# Patient Record
Sex: FEMALE | Race: WHITE | NOT HISPANIC OR LATINO | Employment: FULL TIME | URBAN - METROPOLITAN AREA
[De-identification: names, ages, dates, MRNs, and addresses within clinical notes are randomized per-mention and may not be internally consistent; named-entity substitution may affect disease eponyms.]

---
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
## 2023-04-24 PROBLEM — J18.9 COMMUNITY ACQUIRED PNEUMONIA OF RIGHT LOWER LOBE OF LUNG: Status: ACTIVE | Noted: 2023-04-24

## 2023-07-14 ENCOUNTER — TELEPHONE (OUTPATIENT)
Dept: OBSTETRICS AND GYNECOLOGY | Facility: CLINIC | Age: 18
End: 2023-07-14
Payer: MEDICAID

## 2023-07-14 NOTE — TELEPHONE ENCOUNTER
Called pt in regards to left message. Pt's questions were answered and she vu.   ----- Message from Nu Vicente sent at 7/14/2023  3:26 PM CDT -----  Regarding: Patient call back  Who called:LES PAUL [[id number]]    What is the request in detail: Patient is requesting a call back. Patient states she was advised she has PID and an ovarian cyst and feels her 8/17 apt is too far. She would like to know of she can be seen next week as it is an ER f/u   Please advise.    Can the clinic reply by [facility]? No    Best call back number: [phone]    Additional Information: N/A

## 2023-07-21 ENCOUNTER — TELEPHONE (OUTPATIENT)
Dept: OBSTETRICS AND GYNECOLOGY | Facility: CLINIC | Age: 18
End: 2023-07-21
Payer: MEDICAID

## 2023-07-21 NOTE — TELEPHONE ENCOUNTER
Returned pts call. Pt stated that she has an appt for an ER f/u, however she does not think she should wait that long. Pt was diagnosed with PID and request a sooner appt. Vu and sooner appt scheduled     ----- Message from Fide Cruz sent at 7/21/2023 11:10 AM CDT -----  Regarding: Return Call  Who Called: LES PAUL [[id number]]         Who Left Message for Patient: Trisha         Does the patient know what this is regarding? Yes, schedule         Best Call Back Number: myChart         Additional Information: Patient is returning a call.  Please assist.

## 2023-07-21 NOTE — TELEPHONE ENCOUNTER
Returned pts call. Pt did not answer, left vm for pt to give the office a call back     ----- Message from Fide Cruz sent at 7/21/2023  9:01 AM CDT -----  Regarding: Same Day Appt Request  Name of Who is Calling: LES PAUL [[id number]]           What is the request in detail: Patient is requesting a call back to schedule a same day appointment.  Patient is experiencing pelvic pain.  Please assist.           Can the clinic reply by [facility]: No           What Number to Call Back if not in [facility]: [phone]

## 2023-07-24 ENCOUNTER — TELEPHONE (OUTPATIENT)
Dept: OBSTETRICS AND GYNECOLOGY | Facility: CLINIC | Age: 18
End: 2023-07-24
Payer: MEDICAID

## 2023-07-24 PROBLEM — J18.9 COMMUNITY ACQUIRED PNEUMONIA OF RIGHT LOWER LOBE OF LUNG: Status: RESOLVED | Noted: 2023-04-24 | Resolved: 2023-07-24

## 2023-07-24 NOTE — TELEPHONE ENCOUNTER
Returned pts call. Pt stated she was unable to make appt today. Vu and appt r/s to next avail. Pt Vu    ----- Message from Azul Ibanez sent at 7/24/2023  9:26 AM CDT -----  Regarding: pt called  Name of Who is Calling: LES PAUL [[id number]]      What is the request in detail: pt is requesting to reschedule her appt. Please advise       Can the clinic reply by [facility]: No      What Number to Call Back if not in [facility]: [phone]

## 2023-07-26 ENCOUNTER — HOSPITAL ENCOUNTER (EMERGENCY)
Facility: OTHER | Age: 18
Discharge: HOME | End: 2023-07-27
Attending: EMERGENCY MEDICINE
Payer: MEDICAID

## 2023-07-26 DIAGNOSIS — R10.84 GENERALIZED ABDOMINAL PAIN: Primary | ICD-10-CM

## 2023-07-26 DIAGNOSIS — N83.201 RIGHT OVARIAN CYST: ICD-10-CM

## 2023-07-26 LAB
ALBUMIN SERPL BCP-MCNC: 4.3 G/DL (ref 3.5–5.2)
ALP SERPL-CCNC: 67 U/L (ref 55–135)
ALT SERPL W/O P-5'-P-CCNC: 16 U/L (ref 10–44)
ANION GAP SERPL CALC-SCNC: 10 MMOL/L (ref 8–16)
AST SERPL-CCNC: 21 U/L (ref 10–40)
B-HCG UR QL: NEGATIVE
BASOPHILS # BLD AUTO: 0.05 K/UL (ref 0–0.2)
BASOPHILS NFR BLD: 0.8 % (ref 0–1.9)
BILIRUB SERPL-MCNC: 0.2 MG/DL (ref 0.1–1)
BILIRUB UR QL STRIP: NEGATIVE
BUN SERPL-MCNC: 10 MG/DL (ref 6–20)
CALCIUM SERPL-MCNC: 9.6 MG/DL (ref 8.7–10.5)
CHLORIDE SERPL-SCNC: 106 MMOL/L (ref 95–110)
CLARITY UR: CLEAR
CO2 SERPL-SCNC: 22 MMOL/L (ref 23–29)
COLOR UR: YELLOW
CREAT SERPL-MCNC: 0.8 MG/DL (ref 0.5–1.4)
CTP QC/QA: YES
DIFFERENTIAL METHOD: ABNORMAL
EOSINOPHIL # BLD AUTO: 0.2 K/UL (ref 0–0.5)
EOSINOPHIL NFR BLD: 3.6 % (ref 0–8)
ERYTHROCYTE [DISTWIDTH] IN BLOOD BY AUTOMATED COUNT: 13.9 % (ref 11.5–14.5)
EST. GFR  (NO RACE VARIABLE): >60 ML/MIN/1.73 M^2
GLUCOSE SERPL-MCNC: 90 MG/DL (ref 70–110)
GLUCOSE UR QL STRIP: NEGATIVE
HCT VFR BLD AUTO: 33.5 % (ref 37–48.5)
HGB BLD-MCNC: 10.9 G/DL (ref 12–16)
HGB UR QL STRIP: NEGATIVE
IMM GRANULOCYTES # BLD AUTO: 0.01 K/UL (ref 0–0.04)
IMM GRANULOCYTES NFR BLD AUTO: 0.2 % (ref 0–0.5)
KETONES UR QL STRIP: NEGATIVE
LEUKOCYTE ESTERASE UR QL STRIP: NEGATIVE
LIPASE SERPL-CCNC: 25 U/L (ref 4–60)
LYMPHOCYTES # BLD AUTO: 2.2 K/UL (ref 1–4.8)
LYMPHOCYTES NFR BLD: 34.8 % (ref 18–48)
MCH RBC QN AUTO: 27.3 PG (ref 27–31)
MCHC RBC AUTO-ENTMCNC: 32.5 G/DL (ref 32–36)
MCV RBC AUTO: 84 FL (ref 82–98)
MONOCYTES # BLD AUTO: 0.5 K/UL (ref 0.3–1)
MONOCYTES NFR BLD: 8.4 % (ref 4–15)
NEUTROPHILS # BLD AUTO: 3.2 K/UL (ref 1.8–7.7)
NEUTROPHILS NFR BLD: 52.2 % (ref 38–73)
NITRITE UR QL STRIP: NEGATIVE
NRBC BLD-RTO: 0 /100 WBC
PH UR STRIP: 8 [PH] (ref 5–8)
PLATELET # BLD AUTO: 326 K/UL (ref 150–450)
PMV BLD AUTO: 9.5 FL (ref 9.2–12.9)
POTASSIUM SERPL-SCNC: 3.6 MMOL/L (ref 3.5–5.1)
PROT SERPL-MCNC: 7 G/DL (ref 6–8.4)
PROT UR QL STRIP: NEGATIVE
RBC # BLD AUTO: 3.99 M/UL (ref 4–5.4)
SODIUM SERPL-SCNC: 138 MMOL/L (ref 136–145)
SP GR UR STRIP: 1.01 (ref 1–1.03)
URN SPEC COLLECT METH UR: NORMAL
UROBILINOGEN UR STRIP-ACNC: NEGATIVE EU/DL
WBC # BLD AUTO: 6.17 K/UL (ref 3.9–12.7)

## 2023-07-26 PROCEDURE — 85025 COMPLETE CBC W/AUTO DIFF WBC: CPT | Performed by: PHYSICIAN ASSISTANT

## 2023-07-26 PROCEDURE — 81025 URINE PREGNANCY TEST: CPT | Performed by: PHYSICIAN ASSISTANT

## 2023-07-26 PROCEDURE — 81003 URINALYSIS AUTO W/O SCOPE: CPT | Performed by: PHYSICIAN ASSISTANT

## 2023-07-26 PROCEDURE — 63600175 PHARM REV CODE 636 W HCPCS: Performed by: PHYSICIAN ASSISTANT

## 2023-07-26 PROCEDURE — 83690 ASSAY OF LIPASE: CPT | Performed by: PHYSICIAN ASSISTANT

## 2023-07-26 PROCEDURE — 96374 THER/PROPH/DIAG INJ IV PUSH: CPT | Mod: 59

## 2023-07-26 PROCEDURE — 80053 COMPREHEN METABOLIC PANEL: CPT | Performed by: PHYSICIAN ASSISTANT

## 2023-07-26 PROCEDURE — 99285 EMERGENCY DEPT VISIT HI MDM: CPT | Mod: 25

## 2023-07-26 RX ORDER — KETOROLAC TROMETHAMINE 30 MG/ML
15 INJECTION, SOLUTION INTRAMUSCULAR; INTRAVENOUS
Status: COMPLETED | OUTPATIENT
Start: 2023-07-26 | End: 2023-07-26

## 2023-07-26 RX ORDER — OXYCODONE HYDROCHLORIDE 5 MG/1
5 TABLET ORAL
Status: DISCONTINUED | OUTPATIENT
Start: 2023-07-26 | End: 2023-07-27 | Stop reason: HOSPADM

## 2023-07-26 RX ADMIN — IOHEXOL 75 ML: 350 INJECTION, SOLUTION INTRAVENOUS at 11:07

## 2023-07-26 RX ADMIN — KETOROLAC TROMETHAMINE 15 MG: 30 INJECTION, SOLUTION INTRAMUSCULAR; INTRAVENOUS at 11:07

## 2023-07-27 VITALS
HEART RATE: 81 BPM | RESPIRATION RATE: 18 BRPM | OXYGEN SATURATION: 100 % | DIASTOLIC BLOOD PRESSURE: 69 MMHG | BODY MASS INDEX: 22.2 KG/M2 | TEMPERATURE: 98 F | WEIGHT: 130 LBS | HEIGHT: 64 IN | SYSTOLIC BLOOD PRESSURE: 115 MMHG

## 2023-07-27 LAB
BACTERIA GENITAL QL WET PREP: ABNORMAL
CLUE CELLS VAG QL WET PREP: ABNORMAL
FILAMENT FUNGI VAG WET PREP-#/AREA: ABNORMAL
SPECIMEN SOURCE: ABNORMAL
T VAGINALIS GENITAL QL WET PREP: ABNORMAL
WBC #/AREA VAG WET PREP: ABNORMAL
YEAST GENITAL QL WET PREP: ABNORMAL

## 2023-07-27 PROCEDURE — 25500020 PHARM REV CODE 255: Performed by: PHYSICIAN ASSISTANT

## 2023-07-27 PROCEDURE — 87210 SMEAR WET MOUNT SALINE/INK: CPT | Performed by: PHYSICIAN ASSISTANT

## 2023-07-27 PROCEDURE — 87591 N.GONORRHOEAE DNA AMP PROB: CPT | Performed by: PHYSICIAN ASSISTANT

## 2023-07-27 RX ORDER — KETOROLAC TROMETHAMINE 10 MG/1
10 TABLET, FILM COATED ORAL EVERY 6 HOURS PRN
Qty: 10 TABLET | Refills: 0 | Status: SHIPPED | OUTPATIENT
Start: 2023-07-27

## 2023-07-27 NOTE — ED PROVIDER NOTES
Encounter Date: 7/26/2023       History     Chief Complaint   Patient presents with    Abdominal Pain     dx w/ PID on June 5th. Pt states she was suppose to f/u w/ Gyno but has not. Pt states the pain is getting worse. Pt states she is now having Left flank pain & left pelvic pain     17 y.o F with asthma presenting to the ED with complains of abdominal pain. Patient reports that she was diagnosed with PID on 7/3 and treated with flagyl and doxycycline. She was told to follow up with OBGYN, but was unable to schedule an appointment till end of August. She complains of worsening left lower abdominal and flank pain. Despite taking her antibiotics, she denies improvement of symptoms. Denies any urinary symptoms of discharge, dysuria, hematuria. No NV, change in bowel habits, fever, chills.      Review of patient's allergies indicates:  No Known Allergies  Past Medical History:   Diagnosis Date    Asthma      No past surgical history on file.  No family history on file.  Social History     Tobacco Use    Smoking status: Never     Passive exposure: Current    Smokeless tobacco: Never   Substance Use Topics    Alcohol use: Not Currently    Drug use: Not Currently     ROS:  As per HPI    Physical Exam     Initial Vitals   BP Pulse Resp Temp SpO2   07/26/23 2024 07/26/23 2024 07/26/23 2026 07/26/23 2024 07/26/23 2024   120/71 94 18 98.1 °F (36.7 °C) 97 %      MAP       --                Physical Exam    Constitutional: She appears well-developed and well-nourished.   HENT:   Head: Normocephalic and atraumatic.   Eyes: EOM are normal.   Neck:   Normal range of motion.  Cardiovascular:  Normal rate, regular rhythm and normal heart sounds.           Pulmonary/Chest: Breath sounds normal. No respiratory distress.   Abdominal: Abdomen is soft. Bowel sounds are normal. There is abdominal tenderness.   TTP of left lower abdomen and left flank.   TTP of right upper and left upper abdomen.   + CVA tenderness There is rebound.    Genitourinary:    Genitourinary Comments: Normal appearance of the external genitalia, no skin lesions, erythema or masses. Pink vaginal mucosa.  Cervix surrounding os and scant, clear discharge.  No CMT.  Mild adnexal tenderness without masses.     Musculoskeletal:         General: Normal range of motion.      Cervical back: Normal range of motion.     Neurological: She is oriented to person, place, and time.   Skin: Skin is warm.       ED Course   Procedures  Labs Reviewed   URINALYSIS, REFLEX TO URINE CULTURE    Narrative:     Specimen Source->Urine   CBC W/ AUTO DIFFERENTIAL   COMPREHENSIVE METABOLIC PANEL   LIPASE   POCT URINE PREGNANCY          Imaging Results              US Pelvis Complete Non OB (Final result)  Result time 07/26/23 22:24:13   Procedure changed from US Pelvis Comp with Transvag NON-OB (xpd     Final result by Randy Mercer MD (07/26/23 22:24:13)                   Impression:      Overall limited evaluation secondary to absence of transvaginal images.  Neither ovary was identified.      Electronically signed by: Randy Mercer MD  Date:    07/26/2023  Time:    22:24               Narrative:    EXAMINATION:  US PELVIS COMPLETE NON OB    CLINICAL HISTORY:  pelvic pain;    COMPARISON:  CT, 07/05/2023.    TECHNIQUE:  Transabdominal pelvic ultrasound were performed utilizing grayscale ultrasound and color Doppler.  Transvaginal ultrasound was deferred secondary to patient discomfort.    FINDINGS:  Uterus measures 6.7 x 3.5 x 4.5 cm.  No focal abnormality.  The endometrial stripe is not thickened measuring 0.5 cm.    Neither ovary was visualized.  Of note, the ovaries were in a posterior position on recent prior CT, which may explain difficulty visualizing the ovaries with transabdominal imaging.    There is no significant free fluid within the pelvis.                                       Medications   ketorolac injection 15 mg (has no administration in time range)   oxyCODONE immediate  release tablet 5 mg (has no administration in time range)     Medical Decision Making:   History:   Old Medical Records: I decided to obtain old medical records.  Old Records Summarized: records from another hospital.       <> Summary of Records: CT abd pelvis from 7/5/23  New left adnexal cyst, measuring up to 3.1 cm in greatest axial dimension, likely physiologic.     In the appropriate clinical context, the other new intrapelvic findings could be related to uncomplicated PID, menstruation, or endometriosis.  Other infiltrative pathology less likely but not excluded.  Correlate clinically.     New periportal edema and edema in the gallbladder fossa.  Correlate clinically for underlying liver disease, such as possible hepatitis.  PID, if present, could potentially also cause this appearance.     Previously demonstrated findings suspicious for upper urinary tract infection have largely resolved on the right.  Some residual patchy hypodensities in the left kidney possibly represent developing scars, parenchymal cysts, or other parenchymal lesions.  Recurrent mild left pyelonephritis not fully excluded but considered less likely.  Correlate clinically.     Transitional lumbosacral vertebral segment, as detailed above.     Visualized portions of the breasts demonstrate radiographically dense tissue bilaterally, a risk factor for breast cancer.  Correlate clinically.  Initial Assessment:   17 y.o F with asthma presenting with abdominal pain. Recently diagnosed with PID and has completed antibiotics. Presents today with worsening left lower abdominal pain and left flank pain. On exam, TTP of bilateral upper abdomen and left lower abdomen with Left CVA tenderness.   Differential Diagnosis:   Ovarian cyst rupture, tubo ovarian abscess, kidney stone, UTI, pyelonephritis, Varghese Devendra Daniel syndrome, STD  Clinical Tests:   Lab Tests: Ordered  Radiological Study: Ordered           ED Course as of 07/27/23 0059   Wed Jul 26,  2023 2308 CBC without leukocytosis and stable anemia, H&H 10.9/33.5 [AG]   2308 UPT negative, urinalysis signs of infection. [AG]   2320 CMP and lipase unremarkable [AG]   2342 Pelvic ultrasound without visualization of ovaries due to transabdominal only secondary to patient's pain during transvaginal portion. [AG]   Thu Jul 27, 2023   0058      1. Possible constipation.   2. Probable small 1.8 cm complex involuting right ovarian cyst.  Recommend surveillance.   3. There are 2 small subcentimeter probable benign right lower lobe pulmonary nodules.  See above comments.     Improvement from previous CT abdomen imaging from 07/05.    I do not suspect PID today.  Unclear etiology of patient's pain, may be secondary to ovarian cysts.  Will send home with Toradol, advised close follow-up with her gynecologist and referral placed to GI. [AG]      ED Course User Index  [AG] Justino Yarbrough PA-C               Clinical Impression:      1. Generalized abdominal pain    2. Right ovarian cyst               Justino Yarbrough PA-C  07/27/23 0101

## 2023-07-27 NOTE — FIRST PROVIDER EVALUATION
Emergency Department TeleTriage Encounter Note      CHIEF COMPLAINT    Chief Complaint   Patient presents with    Abdominal Pain     dx w/ PID on June 5th. Pt states she was suppose to f/u w/ Gyno but has not. Pt states the pain is getting worse. Pt states she is now having Left flank pain & left pelvic pain       VITAL SIGNS   Initial Vitals   BP Pulse Resp Temp SpO2   07/26/23 2024 07/26/23 2024 07/26/23 2026 07/26/23 2024 07/26/23 2024   120/71 94 18 98.1 °F (36.7 °C) 97 %      MAP       --                   ALLERGIES    Review of patient's allergies indicates:  No Known Allergies    PROVIDER TRIAGE NOTE  17-year-old female with pelvic pain, treated for PID, completed antibiotics.  Still having pain, referred to ED for ultrasound.      ORDERS  Labs Reviewed - No data to display    ED Orders (720h ago, onward)      None              Virtual Visit Note: The provider triage portion of this emergency department evaluation and documentation was performed via [employer], a HIPAA-compliant telemedicine application, in concert with a tele-presenter in the room. A face to face patient evaluation with one of my colleagues will occur once the patient is placed in an emergency department room.      DISCLAIMER: This note was prepared with [employer] voice recognition transcription software. Garbled syntax, mangled pronouns, and other bizarre constructions may be attributed to that software system.

## 2023-07-28 LAB
C TRACH DNA SPEC QL NAA+PROBE: NOT DETECTED
N GONORRHOEA DNA SPEC QL NAA+PROBE: NOT DETECTED

## 2023-11-16 ENCOUNTER — HOSPITAL ENCOUNTER (EMERGENCY)
Facility: HOSPITAL | Age: 18
Discharge: HOME | End: 2023-11-16
Attending: EMERGENCY MEDICINE
Payer: MEDICAID

## 2023-11-16 VITALS
TEMPERATURE: 98 F | SYSTOLIC BLOOD PRESSURE: 117 MMHG | BODY MASS INDEX: 22.31 KG/M2 | RESPIRATION RATE: 18 BRPM | WEIGHT: 130 LBS | OXYGEN SATURATION: 100 % | DIASTOLIC BLOOD PRESSURE: 66 MMHG | HEART RATE: 100 BPM

## 2023-11-16 DIAGNOSIS — R05.9 COUGH: Primary | ICD-10-CM

## 2023-11-16 DIAGNOSIS — B34.9 VIRAL SYNDROME: ICD-10-CM

## 2023-11-16 DIAGNOSIS — Z87.09 HISTORY OF ASTHMA: ICD-10-CM

## 2023-11-16 LAB
B-HCG UR QL: NEGATIVE
CTP QC/QA: YES
GROUP A STREP, MOLECULAR: NEGATIVE
INFLUENZA A, MOLECULAR: NEGATIVE
INFLUENZA B, MOLECULAR: NEGATIVE
SARS-COV-2 RDRP RESP QL NAA+PROBE: NEGATIVE
SPECIMEN SOURCE: NORMAL

## 2023-11-16 PROCEDURE — 99284 EMERGENCY DEPT VISIT MOD MDM: CPT | Mod: 25

## 2023-11-16 PROCEDURE — 87502 INFLUENZA DNA AMP PROBE: CPT

## 2023-11-16 PROCEDURE — 87651 STREP A DNA AMP PROBE: CPT

## 2023-11-16 PROCEDURE — 81025 URINE PREGNANCY TEST: CPT

## 2023-11-16 PROCEDURE — U0002 COVID-19 LAB TEST NON-CDC: HCPCS

## 2023-11-16 RX ORDER — BENZONATATE 100 MG/1
100 CAPSULE ORAL 3 TIMES DAILY PRN
Qty: 20 CAPSULE | Refills: 0 | Status: SHIPPED | OUTPATIENT
Start: 2023-11-16 | End: 2023-11-26

## 2023-11-16 RX ORDER — ALBUTEROL SULFATE 90 UG/1
AEROSOL, METERED RESPIRATORY (INHALATION)
Qty: 18 G | Refills: 0 | Status: SHIPPED | OUTPATIENT
Start: 2023-11-16

## 2023-11-16 NOTE — Clinical Note
"April"Jerome Jo was seen and treated in our emergency department on 11/16/2023.  She may return to work on 11/17/2023.       If you have any questions or concerns, please don't hesitate to call.      Sara Mclaughlin PA-C"

## 2023-11-16 NOTE — ED TRIAGE NOTES
Pt with hx of asthma arrived with c/o cough x 3 weeks with wheezing x 1 week.  Pt endorses chest congestion and SOB even at rest sometimes.  Denies any fever.  Pt states she has been out of her inhaler for a while.  Pt answering questions appropriately, speaking in complete sentences, respirations even and unlabored.  Aao x 4.

## 2023-11-16 NOTE — ED PROVIDER NOTES
"Encounter Date: 11/16/2023       History     Chief Complaint   Patient presents with    Cough     Pt complains of cough x 3 weeks, + nasal congestion, + sort throat, denies fever, pt has hx of asthma, no wheezing noted in triage, pt request refill on inhaler      Patient is a 18-year-old female with a past medical history of asthma who presents to the emergency room for congestion, sore throat, cough, and intermittent wheezing over the past 3 weeks, worse within the past few days.  Denies fever, chills, body aches, abdominal pain, nausea, vomiting trouble swallowing, chest pain, shortness of breath, or headache.  Patient reports she normally uses an albuterol inhaler, but does not have a refill at this moment.  She works at a  and is around sick individuals frequently. In the past, patient has had pneumonia with similar symptoms, so she is worried for such.  No treatment attempted prior to arrival.    The history is provided by the patient. No  was used.     Review of patient's allergies indicates:  No Known Allergies  Past Medical History:   Diagnosis Date    Asthma      Past Surgical History:   Procedure Laterality Date    EYE SURGERY      cyst removal    uniform implantation      kidney surgery "artificial tubes"     History reviewed. No pertinent family history.  Social History     Tobacco Use    Smoking status: Never     Passive exposure: Current    Smokeless tobacco: Never   Substance Use Topics    Alcohol use: Not Currently    Drug use: Not Currently     Review of Systems   Constitutional:  Negative for chills, diaphoresis, fatigue and fever.   HENT:  Positive for congestion and sore throat. Negative for trouble swallowing.    Respiratory:  Positive for cough (dry) and wheezing (intermittent). Negative for shortness of breath.    Cardiovascular:  Negative for chest pain and palpitations.   Gastrointestinal:  Negative for abdominal pain, blood in stool, constipation, diarrhea, " nausea and vomiting.   Genitourinary:  Negative for difficulty urinating, dysuria, frequency and urgency.   Musculoskeletal:  Negative for back pain and myalgias.   Skin:  Negative for rash and wound.   Neurological:  Negative for weakness, light-headedness, numbness and headaches.       Physical Exam     Initial Vitals [11/16/23 0855]   BP Pulse Resp Temp SpO2   117/66 100 18 98 °F (36.7 °C) 100 %      MAP       --         Physical Exam    Nursing note and vitals reviewed.  Constitutional: She appears well-developed and well-nourished. She is not diaphoretic. No distress.   HENT:   Head: Normocephalic and atraumatic.   Right Ear: External ear normal.   Left Ear: External ear normal.   Mouth/Throat: Uvula is midline and mucous membranes are normal. Posterior oropharyngeal erythema (mild) present. No oropharyngeal exudate or posterior oropharyngeal edema.   Eyes: Conjunctivae and EOM are normal. Pupils are equal, round, and reactive to light.   Neck: Neck supple.   Normal range of motion.  Cardiovascular:  Normal rate, regular rhythm and normal heart sounds.     Exam reveals no friction rub.       No murmur heard.  Pulmonary/Chest: Breath sounds normal. No respiratory distress. She has no wheezes. She has no rhonchi. She has no rales.   Lungs clear to auscultation. Some coughing during exam.    Abdominal: Abdomen is soft. Bowel sounds are normal. She exhibits no distension. There is no abdominal tenderness.   Musculoskeletal:         General: No tenderness or edema. Normal range of motion.      Cervical back: Normal range of motion and neck supple.     Neurological: She is alert and oriented to person, place, and time. She has normal strength.   Skin: Skin is warm and dry. Capillary refill takes less than 2 seconds.   Psychiatric: She has a normal mood and affect. Her behavior is normal. Thought content normal.         ED Course   Procedures  Labs Reviewed   GROUP A STREP, MOLECULAR   INFLUENZA A & B BY MOLECULAR    SARS-COV-2 RNA AMPLIFICATION, QUAL   POCT URINE PREGNANCY          Imaging Results              X-Ray Chest PA And Lateral (Final result)  Result time 11/16/23 10:39:08      Final result by Cortes Jaramillo MD (11/16/23 10:39:08)                   Impression:      As above.      Electronically signed by: Cortes Jaramillo MD  Date:    11/16/2023  Time:    10:39               Narrative:    EXAMINATION:  XR CHEST PA AND LATERAL    CLINICAL HISTORY:  Cough, unspecified    TECHNIQUE:  PA and lateral views of the chest were performed.    FINDINGS:  The lungs are clear of any focal opacity.  There is no pneumothorax or pleural fluid.  The cardiac silhouette is not enlarged.  The osseous structures are unremarkable.                                       Medications - No data to display  Medical Decision Making  Patient presents for coughing and multiple other URI symptoms.  Vital signs stable and within normal limits.  O2 at 100%.  Physical exam as stated above.    Differential Diagnosis includes, but is not limited to viral URI, Strep pharyngitis, viral pharyngitis, foreign body aspiration/ingestion, bronchitis, asthma exacerbation, allergy/atopy, influenza, pertussis, PE, pneumonia, or lung abscess.  I do not suspect foreign body aspiration or ingestion.  Patient without shortness of breath or tachycardia.  No signs of DVT.  I do not suspect pulmonary embolism. Lungs clear to auscultation, no wheezing.  I do not believe breathing treatments warranted at this time. Chest x-ray without signs of consolidation.  I do not suspect pneumonia.  COVID, strep, and influenza negative.  Due to symptoms lasting > 10 days, likely due to asthma exacerbation secondary to viral illness verses bronchitis. Will prescribe albuterol inhaler and Tessalon Perles for cough.    I see no indication of an emergent process beyond that addressed during our encounter. Patient stable for discharge at this time. I have counseled the patient regarding follow  up with PCP and gave strict return precautions. I have discussed the final diagnosis and gave instructions regarding prescribed medications. Patient verbalized understanding and is agreeable.     Amount and/or Complexity of Data Reviewed  Labs: ordered. Decision-making details documented in ED Course.  Radiology: ordered. Decision-making details documented in ED Course.    Risk  Prescription drug management.               ED Course as of 23 1407   Thu 2023   1002 Group A Strep, Molecular  Strep negative. [BJ]   1014 Influenza A & B by Molecular  Influenza negative. [BJ]   1014 COVID-19 Rapid Screening  COVID negative. [BJ]   1042 X-Ray Chest PA And Lateral  Independent interpretation of chest x-ray without signs of consolidation.  No effusion or pneumothorax. [BJ]   1042 X-Ray Chest PA And Lateral  FINDINGS:  The lungs are clear of any focal opacity.  There is no pneumothorax or pleural fluid.  The cardiac silhouette is not enlarged.  The osseous structures are unremarkable.     Impression:     As above. [BJ]      ED Course User Index  [BJ] Sara Mclaughlin PA-C                        Clinical Impression:  Final diagnoses:  [R05.9] Cough (Primary)  [B34.9] Viral syndrome  [Z87.09] History of asthma          ED Disposition Condition    Discharge Stable          ED Prescriptions       Medication Sig Dispense Start Date End Date Auth. Provider    albuterol (PROVENTIL/VENTOLIN HFA) 90 mcg/actuation inhaler SMARTSI Puff(s) By Mouth Every 8 Hours PRN 18 g 2023 -- Sara Mclaughlin PA-C    benzonatate (TESSALON) 100 MG capsule Take 1 capsule (100 mg total) by mouth 3 (three) times daily as needed for Cough. 20 capsule 2023 Sara Mclaughlin PA-C          Follow-up Information       Follow up With Specialties Details Why Contact Info    Barby Godinez MD Internal Medicine Schedule an appointment as soon as possible for a visit  If symptoms worsen [address] [facility]  [id number]  [phone]               Sara Mclaughlin PA-C  11/16/23 1400

## 2023-11-16 NOTE — DISCHARGE INSTRUCTIONS
Your visit in the emergency room today determined that you have a viral illness. This may take around 7 days to pass, but can be managed with over the counter medications. Please see some of my recommendations below:     If not allergic, please take over the counter Tylenol (Acetaminophen) and/or Motrin (Ibuprofen) as directed for control of pain (body aches) and/or fever. You can stagger the dosing so you are taking one or the other every three hours while spacing out the Tylenol and every 6 hours and the Motrin every 6 hours.    You may take an over the counter antihistamine medication (Allegra/Claritin/Zyrtec) as directed for nasal congestion/runny nose. You may also try a decongestant such as Mucinex D or Sudafed (found behind the pharmacy counter). Flonase is also an option that you may use- one spray each nostril twice daily OR two sprays each nostril once daily.    If you have a cough with mucus production, try an Mucinex or Robitussin. If you have a dry cough, Delsym may work better.      Sore throat recommendations: Warm fluids, warm salt water gargles, throat lozenges, tea, honey, soup, rest, hydration.     Please return or see your primary care doctor if you develop new or worsening symptoms.     Thank you for allowing me and my emergency team to take care of you here today! I hope you feel better soon. Please do not hesitate to return with any additional concerns that may arise from this or any new problem you encounter.    Our goal in the emergency department is to always give you outstanding care and exceptional service. If you receive a survey by mail or e-mail in the next week regarding your experience in our ED, we would greatly appreciate you completing it. Your feedback provides us with a way to recognize our staff who give very good care and it helps us learn how to improve when your experience was below the excellence we aspire to be!    Brook [place], PA-C [facility], [place], and   [place]   Emergency Room Physician Assistant